# Patient Record
Sex: FEMALE | Race: AMERICAN INDIAN OR ALASKA NATIVE | NOT HISPANIC OR LATINO | Employment: FULL TIME | URBAN - METROPOLITAN AREA
[De-identification: names, ages, dates, MRNs, and addresses within clinical notes are randomized per-mention and may not be internally consistent; named-entity substitution may affect disease eponyms.]

---
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
## 2022-07-29 LAB
HEP C HIM: NORMAL
HIV 1&2 EXT: NORMAL

## 2022-08-09 LAB
C TRACH DNA SPEC QL PROBE+SIG AMP: NEGATIVE
SPECIMEN DESCRIPTION: NORMAL

## 2023-03-20 ENCOUNTER — MEDICAL CORRESPONDENCE (OUTPATIENT)
Dept: HEALTH INFORMATION MANAGEMENT | Facility: CLINIC | Age: 23
End: 2023-03-20
Payer: MEDICAID

## 2023-04-03 ENCOUNTER — TRANSFERRED RECORDS (OUTPATIENT)
Dept: MULTI SPECIALTY CLINIC | Facility: CLINIC | Age: 23
End: 2023-04-03

## 2023-04-03 LAB — PAP-ABSTRACT: ABNORMAL

## 2023-04-21 ENCOUNTER — MEDICAL CORRESPONDENCE (OUTPATIENT)
Dept: HEALTH INFORMATION MANAGEMENT | Facility: CLINIC | Age: 23
End: 2023-04-21
Payer: MEDICAID

## 2023-05-18 ENCOUNTER — OFFICE VISIT (OUTPATIENT)
Dept: FAMILY MEDICINE | Facility: CLINIC | Age: 23
End: 2023-05-18
Payer: MEDICAID

## 2023-05-18 VITALS
BODY MASS INDEX: 28.52 KG/M2 | DIASTOLIC BLOOD PRESSURE: 72 MMHG | HEART RATE: 70 BPM | OXYGEN SATURATION: 98 % | WEIGHT: 155 LBS | SYSTOLIC BLOOD PRESSURE: 112 MMHG | HEIGHT: 62 IN | RESPIRATION RATE: 18 BRPM

## 2023-05-18 DIAGNOSIS — Z30.430 ENCOUNTER FOR INSERTION OF INTRAUTERINE CONTRACEPTIVE DEVICE: Primary | ICD-10-CM

## 2023-05-18 DIAGNOSIS — Z23 NEED FOR VACCINATION: ICD-10-CM

## 2023-05-18 PROBLEM — R87.612 LOW GRADE SQUAMOUS INTRAEPITHELIAL LESION ON CYTOLOGIC SMEAR OF CERVIX (LGSIL): Status: ACTIVE | Noted: 2022-08-09

## 2023-05-18 PROCEDURE — 90651 9VHPV VACCINE 2/3 DOSE IM: CPT | Performed by: STUDENT IN AN ORGANIZED HEALTH CARE EDUCATION/TRAINING PROGRAM

## 2023-05-18 PROCEDURE — 58300 INSERT INTRAUTERINE DEVICE: CPT | Performed by: STUDENT IN AN ORGANIZED HEALTH CARE EDUCATION/TRAINING PROGRAM

## 2023-05-18 PROCEDURE — 90471 IMMUNIZATION ADMIN: CPT | Performed by: STUDENT IN AN ORGANIZED HEALTH CARE EDUCATION/TRAINING PROGRAM

## 2023-05-18 RX ORDER — COPPER 313.4 MG/1
1 INTRAUTERINE DEVICE INTRAUTERINE ONCE
COMMUNITY
End: 2024-09-23

## 2023-05-18 RX ORDER — TOPIRAMATE 50 MG/1
TABLET, FILM COATED ORAL
COMMUNITY
Start: 2023-04-19 | End: 2024-09-23

## 2023-05-18 RX ORDER — COPPER 313.4 MG/1
1 INTRAUTERINE DEVICE INTRAUTERINE ONCE
Status: COMPLETED
Start: 2023-05-18 | End: 2023-05-18

## 2023-05-18 RX ORDER — METHYLPHENIDATE HYDROCHLORIDE 5 MG/1
TABLET ORAL
COMMUNITY
Start: 2023-04-19 | End: 2024-09-23

## 2023-05-18 RX ADMIN — COPPER 1 EACH: 313.4 INTRAUTERINE DEVICE INTRAUTERINE at 15:18

## 2023-05-18 ASSESSMENT — PAIN SCALES - GENERAL: PAINLEVEL: NO PAIN (0)

## 2023-05-18 NOTE — PROGRESS NOTES
"IUD Insertion:  CONSULT:    Is a pregnancy test required: No.  Was a consent obtained?  Yes    Subjective: Roz Hendrickson is a 22 year old No obstetric history on file. presents for IUD and desires Paragard type IUD.    Patient has been given the opportunity to ask questions about all forms of birth control, including all options appropriate for Roz Hendrickson. Discussed that no method of birth control, except abstinence is 100% effective against pregnancy or sexually transmitted infection.     Roz Hendrickson understands she may have the IUD removed at any time. IUD should be removed by a health care provider.    The entire insertion procedure was reviewed with the patient, including care after placement.    No LMP recorded (lmp unknown). Last sexual activity: 14 days. No allergy to betadine or shellfish. Home pregnancy test negative      /72 (BP Location: Right arm, Patient Position: Sitting, Cuff Size: Adult Regular)   Pulse 70   Resp 18   Ht 1.575 m (5' 2\")   Wt 70.3 kg (155 lb)   LMP  (LMP Unknown)   SpO2 98%   Breastfeeding No   BMI 28.35 kg/m      Pelvic Exam:   EG/BUS: normal genital architecture without lesions, erythema or abnormal secretions.   Vagina: moist, pink, rugae with physiologic discharge and secretions  Cervix: parous no lesions and pink, moist, closed, without lesion or CMT  Uterus: mobile, no pain    PROCEDURE NOTE: -- IUD Insertion    Reason for Insertion: contraception    premedicated with tylenol  Under sterile technique, cervix was visualized with speculum and prepped with Betadine solution swab x 3. Tenaculum was placed for stability. The uterus was gently straightened and sounded to 7.5 cm. IUD prepared for placement, and IUD inserted according to 's instructions without difficulty or significant resitance, and deployed at the fundus. The strings were visualized and trimmed to 2.0 cm from the external os. Tenaculum was removed and hemostasis noted. Speculum " removed.  Patient tolerated procedure well.    Lot # [id number]  Exp: July 2028    EBL: minimal    Complications: none    ASSESSMENT:     ICD-10-CM    1. Encounter for insertion of intrauterine contraceptive device  Z30.430 paragard intrauterine copper device     paragard intrauterine copper IUD device 1 each     INSERTION INTRAUTERINE DEVICE      2. Need for vaccination  Z23 HPV, IM (9 - 26 YRS) - Gardasil 9           PLAN:    Given 's handouts, including when to have IUD removed, list of danger s/sx, side effects and follow up recommended. Encouraged condom use for prevention of STD. Back up contraception advised for 7 days if progestin method. Advised to call for any fever, for prolonged or severe pain or bleeding, abnormal vaginal discharge, or unable to palpate strings. She was advised to use pain medications (ibuprofen) as needed for mild to moderate pain. Advised to follow-up in clinic in 4-6 weeks for IUD string check if unable to find strings or as directed by provider.     Cony Downs MD

## 2023-06-02 ENCOUNTER — HEALTH MAINTENANCE LETTER (OUTPATIENT)
Age: 23
End: 2023-06-02

## 2023-06-19 ENCOUNTER — MEDICAL CORRESPONDENCE (OUTPATIENT)
Dept: HEALTH INFORMATION MANAGEMENT | Facility: CLINIC | Age: 23
End: 2023-06-19
Payer: MEDICAID

## 2023-06-23 ENCOUNTER — TELEPHONE (OUTPATIENT)
Dept: FAMILY MEDICINE | Facility: CLINIC | Age: 23
End: 2023-06-23
Payer: MEDICAID

## 2023-06-23 NOTE — TELEPHONE ENCOUNTER
Patient calling. States she received the HPV vaccine about 1 month ago. Patient states she still has a bump on her arm from where she received the injection. Wondering if that's normal? States its very hard. Its also on the arm she sleeps on.         Could we send this information to you in [employer] or would you prefer to receive a phone call?:   Patient would prefer a phone call   Okay to leave a detailed message?: Yes at Home number on file [phone] (home)

## 2023-06-26 NOTE — TELEPHONE ENCOUNTER
Left detailed message, if the bump is not getting smaller with time or if she had redness or swelling she should come in to be seen, otherwise this should gradually get smaller and be gone in the next few weeks

## 2023-08-21 ENCOUNTER — MEDICAL CORRESPONDENCE (OUTPATIENT)
Dept: HEALTH INFORMATION MANAGEMENT | Facility: CLINIC | Age: 23
End: 2023-08-21
Payer: MEDICAID

## 2024-06-16 ENCOUNTER — HEALTH MAINTENANCE LETTER (OUTPATIENT)
Age: 24
End: 2024-06-16

## 2024-08-28 ENCOUNTER — DOCUMENTATION ONLY (OUTPATIENT)
Dept: FAMILY MEDICINE | Facility: CLINIC | Age: 24
End: 2024-08-28
Payer: COMMERCIAL

## 2024-08-29 ENCOUNTER — APPOINTMENT (OUTPATIENT)
Dept: LAB | Facility: CLINIC | Age: 24
End: 2024-08-29
Payer: COMMERCIAL

## 2024-08-29 NOTE — PROGRESS NOTES
Lab note states for another provider (perhaps outside orders). Informed lab.    Netta Reyez MA on 8/29/2024 at 10:39 AM  HPI      ROS      Physical Exam

## 2024-08-30 DIAGNOSIS — F90.0 ATTENTION DEFICIT HYPERACTIVITY DISORDER, INATTENTIVE TYPE: Primary | ICD-10-CM

## 2024-09-05 ENCOUNTER — LAB (OUTPATIENT)
Dept: LAB | Facility: CLINIC | Age: 24
End: 2024-09-05
Payer: COMMERCIAL

## 2024-09-05 DIAGNOSIS — F90.0 ATTENTION DEFICIT HYPERACTIVITY DISORDER, INATTENTIVE TYPE: ICD-10-CM

## 2024-09-05 LAB
ALBUMIN SERPL BCG-MCNC: 4.3 G/DL (ref 3.5–5.2)
ALP SERPL-CCNC: 54 U/L (ref 40–150)
ALT SERPL W P-5'-P-CCNC: 10 U/L (ref 0–50)
ANION GAP SERPL CALCULATED.3IONS-SCNC: 8 MMOL/L (ref 7–15)
AST SERPL W P-5'-P-CCNC: 19 U/L (ref 0–45)
BILIRUB SERPL-MCNC: 0.2 MG/DL
BUN SERPL-MCNC: 10.7 MG/DL (ref 6–20)
CALCIUM SERPL-MCNC: 9.2 MG/DL (ref 8.8–10.4)
CHLORIDE SERPL-SCNC: 105 MMOL/L (ref 98–107)
CREAT SERPL-MCNC: 0.97 MG/DL (ref 0.51–0.95)
EGFRCR SERPLBLD CKD-EPI 2021: 84 ML/MIN/1.73M2
FOLATE SERPL-MCNC: 37.6 NG/ML (ref 4.6–34.8)
GLUCOSE SERPL-MCNC: 93 MG/DL (ref 70–99)
HCO3 SERPL-SCNC: 25 MMOL/L (ref 22–29)
POTASSIUM SERPL-SCNC: 4.2 MMOL/L (ref 3.4–5.3)
PROT SERPL-MCNC: 7.2 G/DL (ref 6.4–8.3)
SODIUM SERPL-SCNC: 138 MMOL/L (ref 135–145)

## 2024-09-05 PROCEDURE — 82607 VITAMIN B-12: CPT

## 2024-09-05 PROCEDURE — 82746 ASSAY OF FOLIC ACID SERUM: CPT

## 2024-09-05 PROCEDURE — 80053 COMPREHEN METABOLIC PANEL: CPT

## 2024-09-05 PROCEDURE — 36415 COLL VENOUS BLD VENIPUNCTURE: CPT

## 2024-09-06 LAB — VIT B12 SERPL-MCNC: 547 PG/ML (ref 232–1245)

## 2024-09-23 ENCOUNTER — OFFICE VISIT (OUTPATIENT)
Dept: FAMILY MEDICINE | Facility: CLINIC | Age: 24
End: 2024-09-23
Payer: COMMERCIAL

## 2024-09-23 VITALS
RESPIRATION RATE: 18 BRPM | OXYGEN SATURATION: 97 % | DIASTOLIC BLOOD PRESSURE: 68 MMHG | WEIGHT: 124 LBS | TEMPERATURE: 97 F | HEART RATE: 74 BPM | BODY MASS INDEX: 22.82 KG/M2 | HEIGHT: 62 IN | SYSTOLIC BLOOD PRESSURE: 120 MMHG

## 2024-09-23 DIAGNOSIS — Z30.432 ENCOUNTER FOR IUD REMOVAL: Primary | ICD-10-CM

## 2024-09-23 DIAGNOSIS — Z11.3 SCREENING FOR STDS (SEXUALLY TRANSMITTED DISEASES): ICD-10-CM

## 2024-09-23 PROCEDURE — 87591 N.GONORRHOEAE DNA AMP PROB: CPT | Performed by: NURSE PRACTITIONER

## 2024-09-23 PROCEDURE — 87491 CHLMYD TRACH DNA AMP PROBE: CPT | Performed by: NURSE PRACTITIONER

## 2024-09-23 PROCEDURE — 58301 REMOVE INTRAUTERINE DEVICE: CPT | Performed by: NURSE PRACTITIONER

## 2024-09-23 RX ORDER — BUSPIRONE HYDROCHLORIDE 15 MG/1
TABLET ORAL
COMMUNITY
Start: 2024-06-17

## 2024-09-23 RX ORDER — BUPROPION HYDROCHLORIDE 150 MG/1
TABLET ORAL
COMMUNITY
Start: 2024-08-13

## 2024-09-23 ASSESSMENT — PAIN SCALES - GENERAL: PAINLEVEL: NO PAIN (0)

## 2024-09-23 NOTE — PROGRESS NOTES
"  Assessment & Plan     Encounter for IUD removal  Risks and benefits discussed with Roz, she verbalized understanding and wished to proceed with removal of IUD.  IUD was successfully removed with ring forceps.  No complications during procedure and minimal blood loss.  Safe sexual practices discussed, will work with psychiatry regarding medications.  Start prenatal vitamin now prior to conceiving  - REMOVE INTRAUTERINE DEVICE    Screening for STDs (sexually transmitted diseases)    - Chlamydia trachomatis/Neisseria gonorrhoeae by PCR- VAGINAL SELF-SWAB      Subjective   Roz is a 24 year old, presenting for the following health issues:  IUD (IUD paraguard removal. Once IUD is out wants to try to start having kids, would like to discuss current meds if at risk for pregnancy. )        9/23/2024     1:23 PM   Additional Questions   Roomed by [place] LEACH   Accompanied by self         9/23/2024     1:23 PM   Patient Reported Additional Medications   Patient reports taking the following new medications no new meds     HPI     Desires pregnancy     Review of Systems  Constitutional, HEENT, cardiovascular, pulmonary, gi and gu systems are negative, except as otherwise noted.      Objective    /68   Pulse 74   Temp 97  F (36.1  C) (Tympanic)   Resp 18   Ht 1.575 m (5' 2\")   Wt 56.2 kg (124 lb)   LMP 09/01/2024 (Approximate)   SpO2 97%   BMI 22.68 kg/m    Body mass index is 22.68 kg/m .  Physical Exam   GENERAL: alert and no distress   (female): normal female external genitalia, normal urethral meatus, normal vaginal mucosa, IUD strings visualized  PSYCH: mentation appears normal, affect normal/bright          Signed Electronically by: FABIENNE Borjas CNP    "

## 2024-09-24 LAB
C TRACH DNA SPEC QL PROBE+SIG AMP: NEGATIVE
N GONORRHOEA DNA SPEC QL NAA+PROBE: NEGATIVE

## 2025-04-10 ENCOUNTER — APPOINTMENT (OUTPATIENT)
Dept: ULTRASOUND IMAGING | Facility: CLINIC | Age: 25
End: 2025-04-10
Attending: FAMILY MEDICINE
Payer: COMMERCIAL

## 2025-04-10 ENCOUNTER — HOSPITAL ENCOUNTER (EMERGENCY)
Facility: CLINIC | Age: 25
Discharge: HOME | End: 2025-04-10
Attending: FAMILY MEDICINE
Payer: COMMERCIAL

## 2025-04-10 VITALS
DIASTOLIC BLOOD PRESSURE: 83 MMHG | TEMPERATURE: 97.8 F | HEART RATE: 87 BPM | SYSTOLIC BLOOD PRESSURE: 126 MMHG | OXYGEN SATURATION: 98 % | WEIGHT: 155 LBS | RESPIRATION RATE: 18 BRPM | BODY MASS INDEX: 28.52 KG/M2 | HEIGHT: 62 IN

## 2025-04-10 DIAGNOSIS — R07.9 CHEST PAIN, UNSPECIFIED TYPE: ICD-10-CM

## 2025-04-10 LAB
ALBUMIN SERPL BCG-MCNC: 3.5 G/DL (ref 3.5–5.2)
ALP SERPL-CCNC: 67 U/L (ref 40–150)
ALT SERPL W P-5'-P-CCNC: 8 U/L (ref 0–50)
ANION GAP SERPL CALCULATED.3IONS-SCNC: 12 MMOL/L (ref 7–15)
AST SERPL W P-5'-P-CCNC: 20 U/L (ref 0–45)
ATRIAL RATE - MUSE: 77 BPM
BASOPHILS # BLD AUTO: 0 10E3/UL (ref 0–0.2)
BASOPHILS NFR BLD AUTO: 0 %
BILIRUB SERPL-MCNC: 0.2 MG/DL
BUN SERPL-MCNC: 8.2 MG/DL (ref 6–20)
CALCIUM SERPL-MCNC: 9.1 MG/DL (ref 8.8–10.4)
CHLORIDE SERPL-SCNC: 104 MMOL/L (ref 98–107)
CREAT SERPL-MCNC: 0.61 MG/DL (ref 0.51–0.95)
D DIMER PPP FEU-MCNC: 0.53 UG/ML FEU (ref 0–0.5)
DIASTOLIC BLOOD PRESSURE - MUSE: NORMAL MMHG
EGFRCR SERPLBLD CKD-EPI 2021: >90 ML/MIN/1.73M2
EOSINOPHIL # BLD AUTO: 0.1 10E3/UL (ref 0–0.7)
EOSINOPHIL NFR BLD AUTO: 1 %
ERYTHROCYTE [DISTWIDTH] IN BLOOD BY AUTOMATED COUNT: 13.2 % (ref 10–15)
GLUCOSE SERPL-MCNC: 91 MG/DL (ref 70–99)
HCO3 SERPL-SCNC: 21 MMOL/L (ref 22–29)
HCT VFR BLD AUTO: 36.8 % (ref 35–47)
HGB BLD-MCNC: 12.4 G/DL (ref 11.7–15.7)
IMM GRANULOCYTES # BLD: 0 10E3/UL
IMM GRANULOCYTES NFR BLD: 0 %
INTERPRETATION ECG - MUSE: NORMAL
LIPASE SERPL-CCNC: 40 U/L (ref 13–60)
LYMPHOCYTES # BLD AUTO: 2.2 10E3/UL (ref 0.8–5.3)
LYMPHOCYTES NFR BLD AUTO: 20 %
MCH RBC QN AUTO: 28.1 PG (ref 26.5–33)
MCHC RBC AUTO-ENTMCNC: 33.7 G/DL (ref 31.5–36.5)
MCV RBC AUTO: 83 FL (ref 78–100)
MONOCYTES # BLD AUTO: 0.5 10E3/UL (ref 0–1.3)
MONOCYTES NFR BLD AUTO: 4 %
NEUTROPHILS # BLD AUTO: 8 10E3/UL (ref 1.6–8.3)
NEUTROPHILS NFR BLD AUTO: 73 %
NRBC # BLD AUTO: 0 10E3/UL
NRBC BLD AUTO-RTO: 0 /100
P AXIS - MUSE: 55 DEGREES
PLATELET # BLD AUTO: 242 10E3/UL (ref 150–450)
POTASSIUM SERPL-SCNC: 3.8 MMOL/L (ref 3.4–5.3)
PR INTERVAL - MUSE: 136 MS
PROT SERPL-MCNC: 6.7 G/DL (ref 6.4–8.3)
QRS DURATION - MUSE: 76 MS
QT - MUSE: 386 MS
QTC - MUSE: 436 MS
R AXIS - MUSE: 61 DEGREES
RBC # BLD AUTO: 4.42 10E6/UL (ref 3.8–5.2)
SODIUM SERPL-SCNC: 137 MMOL/L (ref 135–145)
SYSTOLIC BLOOD PRESSURE - MUSE: NORMAL MMHG
T AXIS - MUSE: 31 DEGREES
TROPONIN T SERPL HS-MCNC: <6 NG/L
VENTRICULAR RATE- MUSE: 77 BPM
WBC # BLD AUTO: 11 10E3/UL (ref 4–11)

## 2025-04-10 PROCEDURE — 93010 ELECTROCARDIOGRAM REPORT: CPT | Performed by: FAMILY MEDICINE

## 2025-04-10 PROCEDURE — 93005 ELECTROCARDIOGRAM TRACING: CPT | Performed by: FAMILY MEDICINE

## 2025-04-10 PROCEDURE — 82040 ASSAY OF SERUM ALBUMIN: CPT | Performed by: FAMILY MEDICINE

## 2025-04-10 PROCEDURE — 250N000013 HC RX MED GY IP 250 OP 250 PS 637: Performed by: FAMILY MEDICINE

## 2025-04-10 PROCEDURE — 99285 EMERGENCY DEPT VISIT HI MDM: CPT | Mod: 25 | Performed by: FAMILY MEDICINE

## 2025-04-10 PROCEDURE — 99284 EMERGENCY DEPT VISIT MOD MDM: CPT | Performed by: FAMILY MEDICINE

## 2025-04-10 PROCEDURE — 36415 COLL VENOUS BLD VENIPUNCTURE: CPT | Performed by: FAMILY MEDICINE

## 2025-04-10 PROCEDURE — 82565 ASSAY OF CREATININE: CPT | Performed by: FAMILY MEDICINE

## 2025-04-10 PROCEDURE — 84484 ASSAY OF TROPONIN QUANT: CPT | Performed by: FAMILY MEDICINE

## 2025-04-10 PROCEDURE — 85379 FIBRIN DEGRADATION QUANT: CPT | Performed by: FAMILY MEDICINE

## 2025-04-10 PROCEDURE — 84450 TRANSFERASE (AST) (SGOT): CPT | Performed by: FAMILY MEDICINE

## 2025-04-10 PROCEDURE — 83690 ASSAY OF LIPASE: CPT | Performed by: FAMILY MEDICINE

## 2025-04-10 PROCEDURE — 93970 EXTREMITY STUDY: CPT

## 2025-04-10 PROCEDURE — 85004 AUTOMATED DIFF WBC COUNT: CPT | Performed by: FAMILY MEDICINE

## 2025-04-10 PROCEDURE — 85041 AUTOMATED RBC COUNT: CPT | Performed by: FAMILY MEDICINE

## 2025-04-10 RX ORDER — SUCRALFATE ORAL 1 G/10ML
1 SUSPENSION ORAL ONCE
Status: COMPLETED | OUTPATIENT
Start: 2025-04-10 | End: 2025-04-10

## 2025-04-10 RX ADMIN — SUCRALFATE 1 G: 1 SUSPENSION ORAL at 13:49

## 2025-04-10 ASSESSMENT — ACTIVITIES OF DAILY LIVING (ADL)
ADLS_ACUITY_SCORE: 41

## 2025-04-10 ASSESSMENT — COLUMBIA-SUICIDE SEVERITY RATING SCALE - C-SSRS
6. HAVE YOU EVER DONE ANYTHING, STARTED TO DO ANYTHING, OR PREPARED TO DO ANYTHING TO END YOUR LIFE?: NO
2. HAVE YOU ACTUALLY HAD ANY THOUGHTS OF KILLING YOURSELF IN THE PAST MONTH?: NO
1. IN THE PAST MONTH, HAVE YOU WISHED YOU WERE DEAD OR WISHED YOU COULD GO TO SLEEP AND NOT WAKE UP?: NO

## 2025-04-10 NOTE — DISCHARGE INSTRUCTIONS
ICD-10-CM    1. Chest pain, unspecified type  R07.9     no serious findings.   treat as chest wall pain - avoid hgeavy lifting and tylenol could be used.  Take pepcid 20 mg orally up to twice daily for 7 days.  Take maalox or tums as needed. keep the head of be elecvated.  stay hydrated with 64 oz fluid.  return for worsneing.

## 2025-04-10 NOTE — ED NOTES
Discharge instructions reviewed in detail.  Pt verbalized understanding.  No further questions or concerns.     
US at bedside.   
Statement Selected

## 2025-04-10 NOTE — ED PROVIDER NOTES
History     Chief Complaint   Patient presents with    Chest Pain     Started yesterday-midsternal/left sided. Pt is 21 weeks pregnant.      HPI  Roz Hendrickson is a 24 year old female who presents with chest pain that had onset yesterday and was nonexertional and persistent -although worse with lying supine.  For the last 2 weeks she has had a sense of dyspnea on exertion especially going up stairs but discounted this.  Very distant history of asthma as a child but no longer has asthma and uses no inhalers no recent wheezing.  No cough congestion upper respiratory symptoms.  Her left-sided chest pain does radiate to the left scapular region.  No obvious modifying factors.  No obvious exertional component.  No abdominal pain nausea vomiting diarrhea constipation.  History of preeclampsia with her last pregnancy towards the end.  No history of VTE.  No leg edema or trauma.  Allergies:  No Known Allergies    Problem List:    Patient Active Problem List    Diagnosis Date Noted    Low grade squamous intraepithelial lesion on cytologic smear of cervix (LGSIL) 08/09/2022     Priority: Medium     Formatting of this note might be different from the original.  08/09/2022 LSIL   04/03/2023 ASCUS    Provider plan: Pap due in 1 year          Past Medical History:    Past Medical History:   Diagnosis Date    Asthma        Past Surgical History:    Past Surgical History:   Procedure Laterality Date    MYRINGOTOMY, INSERT TUBE(S), ADENOIDECTOMY, COMBINED         Family History:    No family history on file.    Social History:  Marital Status:  Single [1]  Social History     Tobacco Use    Smoking status: Passive Smoke Exposure - Never Smoker   Vaping Use    Vaping status: Never Used        Medications:    buPROPion (WELLBUTRIN XL) 150 MG 24 hr tablet  busPIRone (BUSPAR) 15 MG tablet          Review of Systems  ROS:  5 point ROS negative except as noted above in HPI, including Gen., Resp., CV, GI &  system review.    Physical  "Exam   BP: 126/83  Pulse: 87  Temp: 97.8  F (36.6  C)  Resp: 18  Height: 157.5 cm (5' 2\")  Weight: 70.3 kg (155 lb)  SpO2: 98 %      Physical Exam  Constitutional:       General: She is in acute distress.      Appearance: She is not diaphoretic.   Eyes:      Conjunctiva/sclera: Conjunctivae normal.   Cardiovascular:      Rate and Rhythm: Normal rate and regular rhythm.      Heart sounds: No murmur heard.  Pulmonary:      Effort: Pulmonary effort is normal. No respiratory distress.      Breath sounds: Normal breath sounds. No stridor. No wheezing or rhonchi.   Abdominal:      General: Abdomen is flat. There is distension (gravid).      Palpations: Abdomen is soft. There is no mass.      Tenderness: There is no abdominal tenderness. There is no guarding.   Musculoskeletal:      Cervical back: Neck supple.      Right lower leg: No edema.      Left lower leg: No edema.   Skin:     Coloration: Skin is not pale.      Findings: No rash.   Neurological:      General: No focal deficit present.      Mental Status: She is alert.             ED Course        Procedures                 EKG Interpretation:      Interpreted by Aleksandr Gupta MD  EKG done at 1326 hrs. demonstrates a sinus rhythm at 77 bpm normal axis.  There is no ST change.  No significant T wave changes.  There is a normal R progression.  No Q waves.  Normal intervals.  Normal conduction.  No ectopy.  Impression sinus rhythm 77 bpm no acute change    Critical Care time:  none              Results for orders placed or performed during the hospital encounter of 04/10/25 (from the past 24 hours)   EKG 12-lead, tracing only   Result Value Ref Range    Systolic Blood Pressure  mmHg    Diastolic Blood Pressure  mmHg    Ventricular Rate 77 BPM    Atrial Rate 77 BPM    LA Interval 136 ms    QRS Duration 76 ms     ms    QTc 436 ms    P Axis 55 degrees    R AXIS 61 degrees    T Axis 31 degrees    Interpretation ECG       Sinus rhythm  Normal ECG  No previous ECGs " available     CBC with platelets differential    Narrative    The following orders were created for panel order CBC with platelets differential.  Procedure                               Abnormality         Status                     ---------                               -----------         ------                     CBC with platelets and ...[[id number]]                      Final result                 Please view results for these tests on the individual orders.   Comprehensive metabolic panel   Result Value Ref Range    Sodium 137 135 - 145 mmol/L    Potassium 3.8 3.4 - 5.3 mmol/L    Carbon Dioxide (CO2) 21 (L) 22 - 29 mmol/L    Anion Gap 12 7 - 15 mmol/L    Urea Nitrogen 8.2 6.0 - 20.0 mg/dL    Creatinine 0.61 0.51 - 0.95 mg/dL    GFR Estimate >90 >60 mL/min/1.73m2    Calcium 9.1 8.8 - 10.4 mg/dL    Chloride 104 98 - 107 mmol/L    Glucose 91 70 - 99 mg/dL    Alkaline Phosphatase 67 40 - 150 U/L    AST 20 0 - 45 U/L    ALT 8 0 - 50 U/L    Protein Total 6.7 6.4 - 8.3 g/dL    Albumin 3.5 3.5 - 5.2 g/dL    Bilirubin Total 0.2 <=1.2 mg/dL   Lipase   Result Value Ref Range    Lipase 40 13 - 60 U/L   Troponin T, High Sensitivity   Result Value Ref Range    Troponin T, High Sensitivity <6 <=14 ng/L   D dimer quantitative   Result Value Ref Range    D-Dimer Quantitative 0.53 (H) 0.00 - 0.50 ug/mL FEU    Narrative    This D-dimer assay is intended for use in conjunction with a clinical pretest probability assessment model to exclude pulmonary embolism (PE) and deep venous thrombosis (DVT) in outpatients suspected of PE or DVT. The cut-off value is 0.50 ug/mL FEU.   CBC with platelets and differential   Result Value Ref Range    WBC Count 11.0 4.0 - 11.0 10e3/uL    RBC Count 4.42 3.80 - 5.20 10e6/uL    Hemoglobin 12.4 11.7 - 15.7 g/dL    Hematocrit 36.8 35.0 - 47.0 %    MCV 83 78 - 100 fL    MCH 28.1 26.5 - 33.0 pg    MCHC 33.7 31.5 - 36.5 g/dL    RDW 13.2 10.0 - 15.0 %    Platelet Count 242 150 - 450 10e3/uL    %  Neutrophils 73 %    % Lymphocytes 20 %    % Monocytes 4 %    % Eosinophils 1 %    % Basophils 0 %    % Immature Granulocytes 0 %    NRBCs per 100 WBC 0 <1 /100    Absolute Neutrophils 8.0 1.6 - 8.3 10e3/uL    Absolute Lymphocytes 2.2 0.8 - 5.3 10e3/uL    Absolute Monocytes 0.5 0.0 - 1.3 10e3/uL    Absolute Eosinophils 0.1 0.0 - 0.7 10e3/uL    Absolute Basophils 0.0 0.0 - 0.2 10e3/uL    Absolute Immature Granulocytes 0.0 <=0.4 10e3/uL    Absolute NRBCs 0.0 10e3/uL   US Lower Extremity Venous Duplex Bilateral    Narrative    EXAM: US LOWER EXTREMITY VENOUS DUPLEX BILATERAL  LOCATION: [facility]  DATE: 4/10/2025    INDICATION: 21 weeks pregnant with chest pain and shortness of breath pending d dimer.  COMPARISON: None.  TECHNIQUE: Venous Duplex ultrasound of bilateral lower extremities with and without compression, augmentation and duplex. Color flow and spectral Doppler with waveform analysis performed.    FINDINGS: Exam includes the common femoral, femoral, popliteal veins as well as segmentally visualized deep calf veins and greater saphenous vein.     RIGHT: No deep vein thrombosis. No superficial thrombophlebitis. No popliteal cyst.    LEFT: No deep vein thrombosis. No superficial thrombophlebitis. No popliteal cyst.      Impression    IMPRESSION:  1.  No deep venous thrombosis in the bilateral lower extremities.       Medications   sucralfate (CARAFATE) suspension 1 g (1 g Oral $Given 4/10/25 1340)       Assessments & Plan (with Medical Decision Making)     MDM;  Roz Flemingtex is a 24 year old female presenting with a history of 21 week gestation G2, P1.  She has chest pain for 2 days and shortness of breath on exertion for the last 1 to 2 weeks sent by her OB for further evaluation including for PE.  No history of VTE in the past reassuring bilateral lower extremity exam.  No other associated signs symptoms no fever cough congestion no other respiratory symptoms.  Plan for D-dimer  CBC comprehensive panel lipase troponin EKG and bilateral lower extremity ultrasound in preparation while waiting for the D-dimer to come back.  If D-dimer positive for gestational age and ultrasound negative then will need to go forward for CT PE and I will tell her about this further but did prep her that this may need to be done    Her D-dimer is 0.56, below the threshold for second trimester which is 0.75    Findings on her imaging are reassuring.  At the end of the encounter I learned that her daughter had tested positive for RSV.  She has no current respiratory findings on exam although this could be causing some symptoms.  However I hear no wheezing, there is no congestion.  She has no current cough.  Doubtful at this point that that is the cause of her symptoms.  We discussed that today's evaluation was all about excluding serious and life-threatening illnesses and that was completed.  She should follow-up in clinic for recheck for today's evaluation for venous thromboembolism was negative.      I have reviewed the nursing notes.    I have reviewed the findings, diagnosis, plan and need for follow up with the patient.           Medical Decision Making  The patient's presentation was of moderate complexity (an acute illness with systemic symptoms).    The patient's evaluation involved:  ordering and/or review of 3+ test(s) in this encounter (see separate area of note for details)    The patient's management necessitated moderate risk (prescription drug management including medications given in the ED).        New Prescriptions    No medications on file       Final diagnoses:   Chest pain, unspecified type - no serious findings.   treat as chest wall pain - avoid hgeavy lifting and tylenol could be used.  Take pepcid 20 mg orally up to twice daily for 7 days.  Take maalox or tums as needed. keep the head of be elecvated.  stay hydrated with 64 oz fluid.  return for worsneing.        4/10/2025   [facility]  [facility] EMERGENCY DEPT       Aleksandr Gupta MD  04/10/25 [id number]

## 2025-04-16 LAB
ATRIAL RATE - MUSE: 77 BPM
DIASTOLIC BLOOD PRESSURE - MUSE: NORMAL MMHG
INTERPRETATION ECG - MUSE: NORMAL
P AXIS - MUSE: 55 DEGREES
PR INTERVAL - MUSE: 136 MS
QRS DURATION - MUSE: 76 MS
QT - MUSE: 386 MS
QTC - MUSE: 436 MS
R AXIS - MUSE: 61 DEGREES
SYSTOLIC BLOOD PRESSURE - MUSE: NORMAL MMHG
T AXIS - MUSE: 31 DEGREES
VENTRICULAR RATE- MUSE: 77 BPM

## 2025-06-21 ENCOUNTER — HEALTH MAINTENANCE LETTER (OUTPATIENT)
Age: 25
End: 2025-06-21